# Patient Record
Sex: MALE | Race: WHITE | NOT HISPANIC OR LATINO | Employment: UNEMPLOYED | URBAN - NONMETROPOLITAN AREA
[De-identification: names, ages, dates, MRNs, and addresses within clinical notes are randomized per-mention and may not be internally consistent; named-entity substitution may affect disease eponyms.]

---

## 2017-03-01 ENCOUNTER — OFFICE VISIT (OUTPATIENT)
Dept: RETAIL CLINIC | Facility: CLINIC | Age: 37
End: 2017-03-01

## 2017-03-01 VITALS
TEMPERATURE: 96.8 F | BODY MASS INDEX: 30.24 KG/M2 | OXYGEN SATURATION: 98 % | WEIGHT: 223 LBS | DIASTOLIC BLOOD PRESSURE: 80 MMHG | SYSTOLIC BLOOD PRESSURE: 110 MMHG | RESPIRATION RATE: 18 BRPM | HEART RATE: 90 BPM

## 2017-03-01 DIAGNOSIS — J02.9 SORE THROAT: Primary | ICD-10-CM

## 2017-03-01 LAB
EXPIRATION DATE: NORMAL
INTERNAL CONTROL: NORMAL
Lab: NORMAL
S PYO AG THROAT QL: NEGATIVE

## 2017-03-01 PROCEDURE — 87880 STREP A ASSAY W/OPTIC: CPT | Performed by: NURSE PRACTITIONER

## 2017-03-01 PROCEDURE — 99213 OFFICE O/P EST LOW 20 MIN: CPT | Performed by: NURSE PRACTITIONER

## 2017-03-01 NOTE — PROGRESS NOTES
Subjective   Javid Cao is a 36 y.o. male.   Chief Complaint   Patient presents with   • Sore Throat      Sore Throat    This is a new problem. The current episode started in the past 7 days (4 days). The problem has been unchanged. There has been no fever. Pertinent negatives include no abdominal pain, congestion, coughing, diarrhea, ear pain, headaches, shortness of breath or vomiting. He has tried cool liquids and gargles (nyquil) for the symptoms. The treatment provided mild relief.        The following portions of the patient's history were reviewed and updated as appropriate: allergies, current medications, past family history, past medical history, past social history, past surgical history and problem list.    Current Outpatient Prescriptions:   •  lisinopril (PRINIVIL,ZESTRIL) 10 MG tablet, Take 10 mg by mouth Daily., Disp: , Rfl:     Review of Systems   Constitutional: Negative for appetite change, chills, fatigue, fever and unexpected weight change.   HENT: Positive for postnasal drip and sore throat. Negative for congestion, ear pain, sinus pressure and voice change.    Respiratory: Negative for cough and shortness of breath.    Cardiovascular: Negative for chest pain.   Gastrointestinal: Negative for abdominal pain, diarrhea, nausea and vomiting.   Musculoskeletal: Negative for myalgias.   Skin: Negative for rash.   Neurological: Negative for headaches.     Visit Vitals   • /80 (BP Location: Left arm, Patient Position: Sitting, Cuff Size: Adult)   • Pulse 90   • Temp 96.8 °F (36 °C) (Temporal Artery )   • Resp 18   • Wt 223 lb (101 kg)   • SpO2 98%   • BMI 30.24 kg/m2       Objective   Allergies   Allergen Reactions   • Penicillins        Physical Exam   Constitutional: He is oriented to person, place, and time. He appears well-developed and well-nourished. He is cooperative. He does not appear ill. No distress.   HENT:   Head: Normocephalic and atraumatic.   Right Ear: Tympanic membrane,  external ear and ear canal normal.   Left Ear: Tympanic membrane, external ear and ear canal normal.   Nose: Nose normal.   Mouth/Throat: Mucous membranes are normal. Posterior oropharyngeal erythema (mild erythema with clear PND) present.   Eyes: Conjunctivae, EOM and lids are normal.   Cardiovascular: Normal rate and regular rhythm.    Pulmonary/Chest: Effort normal and breath sounds normal. No respiratory distress.   Abdominal: Soft. Normal appearance and bowel sounds are normal. There is no tenderness.   Lymphadenopathy:     He has no cervical adenopathy.        Right: No supraclavicular adenopathy present.        Left: No supraclavicular adenopathy present.   Neurological: He is alert and oriented to person, place, and time.   Skin: Skin is warm and dry. No rash noted.       Assessment/Plan   Javid was seen today for sore throat.    Diagnoses and all orders for this visit:    Sore throat  -     POC Rapid Strep A      Results for orders placed or performed in visit on 03/01/17   POC Rapid Strep A   Result Value Ref Range    Rapid Strep A Screen Negative Negative, VALID, INVALID, Not Performed    Internal Control Passed Passed    Lot Number [id number]     Expiration Date 7/2018

## 2017-03-01 NOTE — PATIENT INSTRUCTIONS
Today, you have been diagnosed with pharyngitis or sore throat, most likely caused from a virus or allergies (post nasal drip) since your strep was negative today.Viruses do not respond to antibiotics and this will not help to cure your illness. I anticipate sore throat with improve, and then stuffy nose and cough will worsen before it resolves.  Viral illnesses often last 5-10 days. Make sure to cover your mouth when coughing and sneezing and wash your hands to avoid spreading germs to others.  You may use warm salt gargles: one-fourth to one half tsp salt per cup (8 oz.). You may use chloraseptic sprays and lozenges per bottled/package instructions.  You may sip on warm beverages such as honey or lemon tea.  Make sure to take in adequate amounts of fluid (at least 64 oz. of water/day).  Make sure to get plenty of rest.  You may use Tylenol or ibuprofen per bottled instructions to relieve throat pain.  Follow-up if symptoms persist or worsen, or you develop high fever.

## 2021-11-14 PROCEDURE — U0004 COV-19 TEST NON-CDC HGH THRU: HCPCS | Performed by: NURSE PRACTITIONER
